# Patient Record
Sex: MALE | Race: WHITE
[De-identification: names, ages, dates, MRNs, and addresses within clinical notes are randomized per-mention and may not be internally consistent; named-entity substitution may affect disease eponyms.]

---

## 2019-02-05 ENCOUNTER — HOSPITAL ENCOUNTER (OUTPATIENT)
Dept: HOSPITAL 53 - M SFHCPLAZ | Age: 29
End: 2019-02-05
Attending: PHYSICIAN ASSISTANT
Payer: COMMERCIAL

## 2019-02-05 DIAGNOSIS — Z00.00: Primary | ICD-10-CM

## 2019-02-05 LAB
ALBUMIN SERPL BCG-MCNC: 4.4 GM/DL (ref 3.2–5.2)
ALT SERPL W P-5'-P-CCNC: 32 U/L (ref 12–78)
BILIRUB SERPL-MCNC: 0.5 MG/DL (ref 0.2–1)
BUN SERPL-MCNC: 18 MG/DL (ref 7–18)
CALCIUM SERPL-MCNC: 9.2 MG/DL (ref 8.5–10.1)
CHLORIDE SERPL-SCNC: 103 MEQ/L (ref 98–107)
CO2 SERPL-SCNC: 32 MEQ/L (ref 21–32)
CREAT SERPL-MCNC: 1.14 MG/DL (ref 0.7–1.3)
GFR SERPL CREATININE-BSD FRML MDRD: > 60 ML/MIN/{1.73_M2} (ref 60–?)
GLUCOSE SERPL-MCNC: 92 MG/DL (ref 70–100)
HCT VFR BLD AUTO: 48.5 % (ref 42–52)
HGB BLD-MCNC: 16.5 G/DL (ref 13.5–17.5)
MCH RBC QN AUTO: 29.8 PG (ref 27–33)
MCHC RBC AUTO-ENTMCNC: 34 G/DL (ref 32–36.5)
MCV RBC AUTO: 87.7 FL (ref 80–96)
PLATELET # BLD AUTO: 239 10^3/UL (ref 150–450)
POTASSIUM SERPL-SCNC: 4 MEQ/L (ref 3.5–5.1)
PROT SERPL-MCNC: 7.5 GM/DL (ref 6.4–8.2)
RBC # BLD AUTO: 5.53 10^6/UL (ref 4.3–6.1)
SODIUM SERPL-SCNC: 141 MEQ/L (ref 136–145)
T4 FREE SERPL-MCNC: 0.96 NG/DL (ref 0.76–1.46)
TSH SERPL DL<=0.005 MIU/L-ACNC: 1.6 UIU/ML (ref 0.36–3.74)
WBC # BLD AUTO: 5.3 10^3/UL (ref 4–10)

## 2019-02-06 ENCOUNTER — HOSPITAL ENCOUNTER (OUTPATIENT)
Dept: HOSPITAL 53 - M SFHCPLAZ | Age: 29
End: 2019-02-06
Attending: FAMILY MEDICINE
Payer: COMMERCIAL

## 2019-02-06 DIAGNOSIS — H53.2: Primary | ICD-10-CM

## 2019-02-14 ENCOUNTER — HOSPITAL ENCOUNTER (OUTPATIENT)
Dept: HOSPITAL 53 - M SDC | Age: 29
Discharge: HOME | End: 2019-02-14
Attending: OPHTHALMOLOGY
Payer: COMMERCIAL

## 2019-02-14 VITALS — WEIGHT: 185 LBS | BODY MASS INDEX: 25.9 KG/M2 | HEIGHT: 71 IN

## 2019-02-14 DIAGNOSIS — H50.00: Primary | ICD-10-CM

## 2019-02-14 DIAGNOSIS — H53.2: ICD-10-CM

## 2019-02-14 PROCEDURE — 67311 REVISE EYE MUSCLE: CPT

## 2019-02-14 RX ADMIN — FENTANYL CITRATE PRN MCG: 50 INJECTION, SOLUTION INTRAMUSCULAR; INTRAVENOUS at 09:55

## 2019-02-14 RX ADMIN — FENTANYL CITRATE PRN MCG: 50 INJECTION, SOLUTION INTRAMUSCULAR; INTRAVENOUS at 09:45

## 2019-02-14 RX ADMIN — FENTANYL CITRATE PRN MCG: 50 INJECTION, SOLUTION INTRAMUSCULAR; INTRAVENOUS at 09:50

## 2019-02-14 RX ADMIN — FENTANYL CITRATE PRN MCG: 50 INJECTION, SOLUTION INTRAMUSCULAR; INTRAVENOUS at 10:00

## 2019-02-18 NOTE — RO
DATE OF PROCEDURE:  02/14/2019

 

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

 

POSTOPERATIVE DIAGNOSIS:  Right esotropia 40 prism diopters status post bilateral

medial rectus recession 5.5 mm.

 

SURGERY:  Bilateral (both eyes) medial rectus recession 5.5 mm.

 

SURGEON:  Jose Hernandez DO

 

ASSISTANT:  Ayana Browne (3rd year medical student)

 

ANESTHESIA:  General, laryngeal mask airway (LMA).

 

INDICATION:  Visually significant right esotropia 40 prism diopters.

 

SPECIMENS:  None.

 

ESTIMATED BLOOD LOSS:  Minimal.

 

COMPLICATIONS:  None.

 

PROCEDURE IN DETAIL:  After obtaining informed consent, the patient was taken to

the operating room where a time-out was performed confirming we had the correct

patient and operative site, which is both eyes.

 

The patient was prepped and draped in a sterile fashion (both eyes).  A lid

speculum was introduced in the right eye.  A fornix spaced incision was made

inferonasally and the medial rectus was snared and the check ligaments and

connective tissue were dissected with blunt and sharp dissection.  The muscle was

tied locking #5-0 Vicryl suture.  The muscle disinserted from the eye.  A 5.5 mm

recession was performed.  The conjunctiva was closed with several interrupted

#6-0 plain sutures.  The lid speculum was removed and an identical procedure was

performed on the left eye.

 

At the end of the case, the patient's eyes were straight and he was returned to

the recovery area in excellent condition.  He will followup in the office.

## 2021-05-04 ENCOUNTER — HOSPITAL ENCOUNTER (OUTPATIENT)
Dept: HOSPITAL 53 - M SFHCPLAZ | Age: 31
End: 2021-05-04
Attending: NURSE PRACTITIONER
Payer: COMMERCIAL

## 2021-05-04 DIAGNOSIS — Z13.220: ICD-10-CM

## 2021-05-04 DIAGNOSIS — Z13.228: Primary | ICD-10-CM

## 2021-05-04 LAB
CHOLEST SERPL-MCNC: 228 MG/DL (ref ?–200)
CHOLEST/HDLC SERPL: 4.47 {RATIO} (ref ?–5)
EST. AVERAGE GLUCOSE BLD GHB EST-MCNC: 100 MG/DL (ref 60–110)
HDLC SERPL-MCNC: 51 MG/DL (ref 40–?)
LDLC SERPL CALC-MCNC: 152 MG/DL (ref ?–100)
NONHDLC SERPL-MCNC: 177 MG/DL
T4 FREE SERPL-MCNC: 1.02 NG/DL (ref 0.76–1.46)
TRIGL SERPL-MCNC: 124 MG/DL (ref ?–150)
TSH SERPL DL<=0.005 MIU/L-ACNC: 1.39 UIU/ML (ref 0.36–3.74)